# Patient Record
Sex: MALE | Race: WHITE | Employment: STUDENT | URBAN - METROPOLITAN AREA
[De-identification: names, ages, dates, MRNs, and addresses within clinical notes are randomized per-mention and may not be internally consistent; named-entity substitution may affect disease eponyms.]

---
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
## 2025-08-08 ENCOUNTER — OFFICE VISIT (OUTPATIENT)
Dept: FAMILY MEDICINE CLINIC | Facility: CLINIC | Age: 20
End: 2025-08-08

## 2025-08-08 VITALS
WEIGHT: 205 LBS | RESPIRATION RATE: 18 BRPM | DIASTOLIC BLOOD PRESSURE: 68 MMHG | OXYGEN SATURATION: 99 % | TEMPERATURE: 98 F | SYSTOLIC BLOOD PRESSURE: 124 MMHG | HEART RATE: 83 BPM | BODY MASS INDEX: 28.7 KG/M2 | HEIGHT: 71 IN

## 2025-08-08 DIAGNOSIS — L05.91 INFECTED PILONIDAL CYST: Primary | ICD-10-CM

## 2025-08-13 ENCOUNTER — MED REC SCAN ONLY (OUTPATIENT)
Dept: FAMILY MEDICINE CLINIC | Facility: CLINIC | Age: 20
End: 2025-08-13

## (undated) NOTE — LETTER
2018      RE: Mary Lou Hands  : 2005      To Whom it May Concern,      Mary Lou Cintron was seen in office 2018 and diagnosed with Mononucleosis. He must be out of PE and other sports related activities 2018 through 3/5/2018.  He must

## (undated) NOTE — LETTER
2019      RE: Cleave Hands  : 2005      To Whom it May Concern,      Mary Lou Cintron was seen in office 2019. Please excuse Mary Lou Cintron from school 2019 and possibly 2019 due to illness.         Pj Shoulder

## (undated) NOTE — MR AVS SNAPSHOT
[facility] [address]  [phone]               Thank you for choosing us for your health care visit with Jana Giang PT.   We are glad to serve you and happy to provide you with this summary of Proxy Access to your child’s MyChart go to [email or web address] and click on the   Sign Up Forms link in the Additional Information box on the right. MyChart Questions? Call [phone] for help.   MyChart is NOT to be used for urgent needs

## (undated) NOTE — MR AVS SNAPSHOT
[facility] [address]  [phone]               Thank you for choosing us for your health care visit with Sri Peralta PT.   We are glad to serve you and happy to provide you with this summary of Call [phone] for help. Soldt is NOT to be used for urgent needs. For medical emergencies, dial 911.                Visit [facility] online at  [email or web address]

## (undated) NOTE — MR AVS SNAPSHOT
[facility] [address]  [phone]               Thank you for choosing us for your health care visit with Sri Peralta PT.   We are glad to serve you and happy to provide you with this summary of Proxy Access to your child’s MyChart go to [email or web address] and click on the   Sign Up Forms link in the Additional Information box on the right. MyChart Questions? Call [phone] for help.   MyChart is NOT to be used for urgent needs o Limiting fast food, take out food, and eating out at restaurants  o Preparing foods at home as a family  o Eating a diet rich in calcium  o Eating a high fiber diet    Help your children form healthy habits.   Healthy active children are more likely to be

## (undated) NOTE — MR AVS SNAPSHOT
[facility] [place] [address]  [phone]               Thank you for choosing us for your health care visit with Jana Giang PT.   We are glad to serve you and happy to provide you with this summary of Proxy Access to your child’s MyChart go to [email or web address] and click on the   Sign Up Forms link in the Additional Information box on the right. MyChart Questions? Call [phone] for help.   MyChart is NOT to be used for urgent needs

## (undated) NOTE — LETTER
Patient Name: Amelia Pryor  YOB: 2005          MRN number:  [id number]  Date:  7/3/2017  Referring Physician:  Deisy Jauregui    Discharge Summary  Initial Functional Outcome Score 57/100  Final Functional Outcome Score 98/100  Number of Visi · Pt will improve lower abdominal recruitment to perform proper isometric contraction without requiring verbal or tactile cuing to promote advancement of therex (2 visits) MET 7/3/2017  · Pt will improve lumbar spine AROM flexion to >100% without pain to a